# Patient Record
Sex: MALE | Race: BLACK OR AFRICAN AMERICAN | NOT HISPANIC OR LATINO | Employment: FULL TIME | URBAN - METROPOLITAN AREA
[De-identification: names, ages, dates, MRNs, and addresses within clinical notes are randomized per-mention and may not be internally consistent; named-entity substitution may affect disease eponyms.]

---

## 2022-08-27 ENCOUNTER — OFFICE VISIT (OUTPATIENT)
Dept: URGENT CARE | Facility: CLINIC | Age: 27
End: 2022-08-27
Payer: COMMERCIAL

## 2022-08-27 VITALS
WEIGHT: 175 LBS | OXYGEN SATURATION: 99 % | DIASTOLIC BLOOD PRESSURE: 84 MMHG | HEART RATE: 77 BPM | HEIGHT: 68 IN | SYSTOLIC BLOOD PRESSURE: 134 MMHG | BODY MASS INDEX: 26.52 KG/M2 | TEMPERATURE: 96 F | RESPIRATION RATE: 18 BRPM

## 2022-08-27 DIAGNOSIS — Z76.0 MEDICATION REFILL: ICD-10-CM

## 2022-08-27 DIAGNOSIS — Z11.3 SCREEN FOR STD (SEXUALLY TRANSMITTED DISEASE): Primary | ICD-10-CM

## 2022-08-27 LAB
C TRACH DNA SPEC QL NAA+PROBE: NOT DETECTED
N GONORRHOEA DNA SPEC QL NAA+PROBE: NOT DETECTED

## 2022-08-27 PROCEDURE — 86593 SYPHILIS TEST NON-TREP QUANT: CPT

## 2022-08-27 PROCEDURE — 87591 N.GONORRHOEAE DNA AMP PROB: CPT

## 2022-08-27 PROCEDURE — 87491 CHLMYD TRACH DNA AMP PROBE: CPT

## 2022-08-27 PROCEDURE — 80074 ACUTE HEPATITIS PANEL: CPT

## 2022-08-27 PROCEDURE — 86592 SYPHILIS TEST NON-TREP QUAL: CPT

## 2022-08-27 PROCEDURE — 99214 OFFICE O/P EST MOD 30 MIN: CPT | Mod: S$GLB,,,

## 2022-08-27 PROCEDURE — 87389 HIV-1 AG W/HIV-1&-2 AB AG IA: CPT

## 2022-08-27 PROCEDURE — 86780 TREPONEMA PALLIDUM: CPT

## 2022-08-27 PROCEDURE — 99214 PR OFFICE/OUTPT VISIT, EST, LEVL IV, 30-39 MIN: ICD-10-PCS | Mod: S$GLB,,,

## 2022-08-27 PROCEDURE — 87661 TRICHOMONAS VAGINALIS AMPLIF: CPT

## 2022-08-27 RX ORDER — EMTRICITABINE AND TENOFOVIR DISOPROXIL FUMARATE 200; 300 MG/1; MG/1
1 TABLET, FILM COATED ORAL DAILY
Qty: 30 TABLET | Refills: 0 | Status: SHIPPED | OUTPATIENT
Start: 2022-08-27 | End: 2022-09-26

## 2022-08-27 RX ORDER — EMTRICITABINE AND TENOFOVIR DISOPROXIL FUMARATE 200; 300 MG/1; MG/1
1 TABLET, FILM COATED ORAL DAILY
COMMUNITY
End: 2022-08-27 | Stop reason: SDUPTHER

## 2022-08-27 NOTE — PATIENT INSTRUCTIONS
STD Screening  You were tested for STDs on today  As far the STD testing, we may hold off on any medications until the labs result. We will phone in medication for you if needed.  If we have decided to treat you now be sure to take all the meds as directed.  If you need more meds when the labs result we will call you and phone them in for you.  If you do test positive for STDs you should be retested in about 3 months

## 2022-08-27 NOTE — PROGRESS NOTES
"Subjective:       Patient ID: Brandon Pal is a 27 y.o. male.    Vitals:  height is 5' 8" (1.727 m) and weight is 79.4 kg (175 lb). His tympanic temperature is 96.2 °F (35.7 °C). His blood pressure is 134/84 and his pulse is 77. His respiration is 18 and oxygen saturation is 99%.     Chief Complaint: Exposure to STD    Patient presents for STD check. No symptoms. Last not protected sexual intercourse, 7 days ago Sunday 08/20/2022. Not stable partner. Partner asymptomatic.      Exposure to STD  The patient's pertinent negatives include no genital injury, genital itching, genital lesions, pelvic pain, penile discharge, penile pain, priapism, scrotal swelling or testicular pain. This is a new problem. The current episode started in the past 7 days. The patient is experiencing no pain. Pertinent negatives include no abdominal pain, anorexia, chest pain, chills, constipation, coughing, diarrhea, discolored urine, dysuria, fever, flank pain, frequency, headaches, hematuria, hesitancy, joint pain, joint swelling, nausea, painful intercourse, rash, shortness of breath, sore throat, urgency, urinary retention or vomiting. Nothing aggravates the symptoms. He has tried nothing for the symptoms. He is sexually active. He inconsistently uses condoms. It is unknown whether or not his partner has an STD. There is no history of BPH, chlamydia, cryptorchidism, erectile aid use, erectile dysfunction, a femoral hernia, gonorrhea, herpes simplex, HIV, an inguinal hernia, kidney stones, prostatitis, sickle cell disease, syphilis or varicocele.     Constitution: Negative. Negative for chills and fever.   HENT: Negative.  Negative for sore throat.    Neck: neck negative. Negative for painful lymph nodes.   Cardiovascular: Negative.  Negative for chest pain, leg swelling and sob on exertion.   Respiratory:  Negative for cough and shortness of breath.    Gastrointestinal:  Negative for abdominal pain, nausea, vomiting, constipation and " diarrhea.   Endocrine: negative. hair loss and cold intolerance.   Genitourinary:  Negative for dysuria, frequency, urgency, urine decreased, flank pain, bladder incontinence, hematuria, genital trauma, painful ejaculation, genital sore, penile discharge, painful ejaculation, penile pain, penile swelling, scrotal swelling, testicular pain and pelvic pain.   Musculoskeletal: Negative.  Negative for pain, back pain and history of spine disorder.   Skin: Negative.  Negative for rash and hives.   Allergic/Immunologic: Negative for hives.   Neurological:  Negative for headaches, disorientation and altered mental status.   Hematologic/Lymphatic: Negative.  Negative for swollen lymph nodes and easy bruising/bleeding. Does not bruise/bleed easily.   Psychiatric/Behavioral: Negative.  Negative for altered mental status, disorientation and confusion.      Objective:      Physical Exam   Constitutional: He is oriented to person, place, and time. He appears well-developed.   HENT:   Head: Normocephalic and atraumatic.   Mouth/Throat: Mucous membranes are normal.   Eyes: Conjunctivae and lids are normal.   Neck: Trachea normal. Neck supple.   Pulmonary/Chest: Effort normal. No respiratory distress.   Abdominal: Normal appearance and bowel sounds are normal. He exhibits no distension, no abdominal bruit, no pulsatile midline mass and no mass. Soft. There is no abdominal tenderness. There is no rebound, no tenderness at McBurney's point, negative Hensley's sign, no left CVA tenderness, negative Rovsing's sign, negative psoas sign, no right CVA tenderness and negative obturator sign. A hernia is present. Hernia confirmed positive in the right inguinal area. Hernia confirmed negative in the umbilical area, confirmed negative in the ventral area, confirmed negative in the left inguinal area, confirmed negative in the right femoral and confirmed negative in the no left femoral area.   Musculoskeletal: Normal range of motion.          General: Normal range of motion.   Neurological: He is alert and oriented to person, place, and time. He has normal strength.   Skin: Skin is warm, dry, intact, not diaphoretic and not pale.   Psychiatric: His speech is normal and behavior is normal. Judgment and thought content normal.   Nursing note and vitals reviewed.      Assessment:       1. Screen for STD (sexually transmitted disease)    2. Medication refill          Plan:         Screen for STD (sexually transmitted disease)  -     C. trachomatis/N. gonorrhoeae by AMP DNA [facility]; Urine  -     Trichomonas vaginalis, RNA, Qual, Urine (Males Only)  -     HIV 1/2 Ag/Ab (4th Gen); Future; Expected date: 08/27/2022  -     HEPATITIS PANEL, ACUTE; Future; Expected date: 08/27/2022  -     RPR; Future; Expected date: 08/27/2022    Medication refill  -     emtricitabine-tenofovir 200-300 mg (TRUVADA) 200-300 mg Tab; Take 1 tablet by mouth once daily.  Dispense: 30 tablet; Refill: 0         Medical Decision Making:   Initial Assessment:   PT in room AAOX4, skin W/D, resp E/U, nonsurgical abdomen, non toxic in appearance, NAD.    Urgent Care Management:  Discussed with STD testing with PT. PT is nontoxic appearing and vitals are stable.  Patient is essentially neurovascularly intact on exam.   no concern for sepsis,  STD testing done with urine, trich, and GC. Discussed RPR, HIV, Hepatitis, Discussed in depth with patient, we will notify patient of the results in the next 3-7 days; can receive results on [email or web address]. Discussed  STD precautions given.  Reviewed PT must remain abstinent until complete all treatments for least 7-14 days.  If symptoms do not improve/worsens, patient was referred back to PCP for continued outpatient workup and management. Patient was instructed to return for re-evaluation for any worsening or change in current symptoms.  Patient requesting to have refill of PREP treatment, patient's prep will be refilled discussed with patient to please follow  up with primary care or the person who has filled this medication.  Patient reports last taking this medicine last night.  Patient verbalized understanding and agreed with the entirety of plan of care. PT ambulatory out of clinic NAD.

## 2022-08-29 LAB
HIV 1+2 AB+HIV1 P24 AG SERPL QL IA: NEGATIVE
RPR SER QL: REACTIVE
RPR SER-TITR: ABNORMAL {TITER}

## 2022-08-30 LAB
HAV IGM SERPL QL IA: NEGATIVE
HBV CORE IGM SERPL QL IA: NEGATIVE
HBV SURFACE AG SERPL QL IA: NEGATIVE
HCV AB SERPL QL IA: NEGATIVE

## 2022-08-31 LAB
T VAGINALIS RRNA SPEC QL NAA+PROBE: NOT DETECTED
TRICHOMONAS VAGINALIS RNA, QUAL, SOURCE: NORMAL

## 2022-09-01 LAB — T PALLIDUM AB SER QL IF: REACTIVE

## 2022-09-02 ENCOUNTER — TELEPHONE (OUTPATIENT)
Dept: URGENT CARE | Facility: CLINIC | Age: 27
End: 2022-09-02
Payer: COMMERCIAL

## 2022-09-02 DIAGNOSIS — A53.9 SYPHILIS (ACQUIRED): Primary | ICD-10-CM

## 2022-09-02 RX ORDER — DOXYCYCLINE HYCLATE 100 MG
100 TABLET ORAL 2 TIMES DAILY
Qty: 28 TABLET | Refills: 0 | Status: SHIPPED | OUTPATIENT
Start: 2022-09-02 | End: 2022-09-16

## 2022-09-02 NOTE — TELEPHONE ENCOUNTER
Take doxycycline twice a day for next 14 days.    Sexual partners from 90 days preceding diagnosis should receive empiric primary syphilis treatment         - Please use condoms for future sexual intercourse  - Please seek medical attention if you begin to experience any new symptoms.     Notify sexual partners of the need for testing + treatment.  Complete ALL medication prescribed!    NO sexual intercourse now and UNTIL 7 days after treatment.     Retest to ensure infection has cleared-there are infections that require more agressive treatment.  Retest for all STDS in 6 weeks and again in 6 months to ensure true negative results.            Results for orders placed or performed in visit on 08/27/22   C. trachomatis/N. gonorrhoeae by AMP DNA [facility]; Urine    Specimen: Genital   Result Value Ref Range    Chlamydia, Amplified DNA Not Detected Not Detected    N gonorrhoeae, amplified DNA Not Detected Not Detected   Trichomonas vaginalis, RNA, Qual, Urine (Males Only)    Specimen: Urine   Result Value Ref Range    Trichomonas vaginalis RNA, Qual, source See Below     Trichomonas vaginalis, QL, TMA Not detected    RPR   Result Value Ref Range    RPR Reactive (A) Non-reactive   HEPATITIS PANEL, ACUTE   Result Value Ref Range    Hepatitis B Surface Ag Negative Negative    Hep B C IgM Negative Negative    Hep A IgM Negative Negative    Hepatitis C Ab Negative Negative   HIV 1/2 Ag/Ab (4th Gen)   Result Value Ref Range    HIV 1/2 Ag/Ab Negative Negative   RPR, Quantitative   Result Value Ref Range    RPR Quantitative 1:2 (A)    FTA antibodies, IgG and IgM   Result Value Ref Range    FTA-ABS Reactive (A) Non-reactive   Please note: Today's testing will give no crediable information if you have unprotected sexual activities going forward. Syphillis cases are rising!  Gonorrhea has RESISTANT strains which is why repeat testing after treatment is important.  Gonorrhea may be present in multiple sites from just ONE area of  exposure.  For those who have high risk sexual behaviors and are on Truvada for PrEP- you have additional protection against HIV ONLY.      REMEMBER WEAR CONDOMS AND GET TESTED OFTEN!          Walk in [employer]

## 2022-10-27 ENCOUNTER — OFFICE VISIT (OUTPATIENT)
Dept: URGENT CARE | Facility: CLINIC | Age: 27
End: 2022-10-27
Payer: COMMERCIAL

## 2022-10-27 VITALS
HEIGHT: 68 IN | BODY MASS INDEX: 27.74 KG/M2 | TEMPERATURE: 101 F | SYSTOLIC BLOOD PRESSURE: 128 MMHG | RESPIRATION RATE: 18 BRPM | HEART RATE: 115 BPM | OXYGEN SATURATION: 97 % | WEIGHT: 183 LBS | DIASTOLIC BLOOD PRESSURE: 80 MMHG

## 2022-10-27 DIAGNOSIS — R05.9 COUGH, UNSPECIFIED TYPE: ICD-10-CM

## 2022-10-27 DIAGNOSIS — J10.1 INFLUENZA A: Primary | ICD-10-CM

## 2022-10-27 LAB
CTP QC/QA: YES
POC MOLECULAR INFLUENZA A AGN: POSITIVE
POC MOLECULAR INFLUENZA B AGN: NEGATIVE

## 2022-10-27 PROCEDURE — 99213 OFFICE O/P EST LOW 20 MIN: CPT | Mod: S$GLB,,, | Performed by: NURSE PRACTITIONER

## 2022-10-27 PROCEDURE — 87502 INFLUENZA DNA AMP PROBE: CPT | Mod: QW,S$GLB,, | Performed by: NURSE PRACTITIONER

## 2022-10-27 PROCEDURE — 3079F PR MOST RECENT DIASTOLIC BLOOD PRESSURE 80-89 MM HG: ICD-10-PCS | Mod: CPTII,S$GLB,, | Performed by: NURSE PRACTITIONER

## 2022-10-27 PROCEDURE — 87502 POCT INFLUENZA A/B MOLECULAR: ICD-10-PCS | Mod: QW,S$GLB,, | Performed by: NURSE PRACTITIONER

## 2022-10-27 PROCEDURE — 99213 PR OFFICE/OUTPT VISIT, EST, LEVL III, 20-29 MIN: ICD-10-PCS | Mod: S$GLB,,, | Performed by: NURSE PRACTITIONER

## 2022-10-27 PROCEDURE — 3074F SYST BP LT 130 MM HG: CPT | Mod: CPTII,S$GLB,, | Performed by: NURSE PRACTITIONER

## 2022-10-27 PROCEDURE — 1159F MED LIST DOCD IN RCRD: CPT | Mod: CPTII,S$GLB,, | Performed by: NURSE PRACTITIONER

## 2022-10-27 PROCEDURE — 1159F PR MEDICATION LIST DOCUMENTED IN MEDICAL RECORD: ICD-10-PCS | Mod: CPTII,S$GLB,, | Performed by: NURSE PRACTITIONER

## 2022-10-27 PROCEDURE — 3079F DIAST BP 80-89 MM HG: CPT | Mod: CPTII,S$GLB,, | Performed by: NURSE PRACTITIONER

## 2022-10-27 PROCEDURE — 3074F PR MOST RECENT SYSTOLIC BLOOD PRESSURE < 130 MM HG: ICD-10-PCS | Mod: CPTII,S$GLB,, | Performed by: NURSE PRACTITIONER

## 2022-10-27 RX ORDER — ALBUTEROL SULFATE 90 UG/1
2 AEROSOL, METERED RESPIRATORY (INHALATION) EVERY 6 HOURS PRN
Qty: 18 G | Refills: 0 | Status: SHIPPED | OUTPATIENT
Start: 2022-10-27 | End: 2022-12-26

## 2022-10-27 NOTE — PROGRESS NOTES
"Subjective:       Patient ID: Brandon Pal is a 27 y.o. male.    Vitals:  height is 5' 8" (1.727 m) and weight is 83 kg (183 lb). His tympanic temperature is 100.9 °F (38.3 °C) (abnormal). His blood pressure is 128/80 and his pulse is 115 (abnormal). His respiration is 18 and oxygen saturation is 97%.     Chief Complaint: Cough    27 yr old male presents to the Urgent Care with complaint of chills, nasal congestion, headache and wheezing x 5 days. Patient reports fever today. Patient denies any CP, SOB, and abdominal pain.     Cough  This is a new problem. The current episode started in the past 7 days. The problem has been gradually worsening. The problem occurs constantly. The cough is Non-productive. Associated symptoms include chills, a fever, headaches, nasal congestion, postnasal drip and wheezing. Pertinent negatives include no chest pain, ear congestion, ear pain, heartburn, hemoptysis, myalgias, rash, rhinorrhea, sore throat, shortness of breath, sweats or weight loss. Treatments tried: rx meds that did not work. The treatment provided no relief. There is no history of asthma, bronchiectasis, bronchitis, COPD, emphysema, environmental allergies or pneumonia.     Constitution: Positive for chills and fever. Negative for sweating and fatigue.   HENT:  Positive for postnasal drip. Negative for ear pain, congestion, sinus pain, sinus pressure, sore throat, trouble swallowing and voice change.    Cardiovascular:  Negative for chest pain and sob on exertion.   Respiratory:  Positive for cough and wheezing. Negative for sputum production, bloody sputum and shortness of breath.    Gastrointestinal:  Negative for heartburn.   Musculoskeletal:  Negative for muscle ache.   Skin:  Negative for rash.   Allergic/Immunologic: Negative for environmental allergies.   Neurological:  Positive for headaches. Negative for altered mental status.   Psychiatric/Behavioral:  Negative for altered mental status.      Objective:    "   Physical Exam   Constitutional: He is oriented to person, place, and time. He appears well-developed. He is cooperative.  Non-toxic appearance. He does not appear ill.   HENT:   Head: Atraumatic.   Ears:   Right Ear: Hearing, tympanic membrane, external ear and ear canal normal.   Left Ear: Hearing, tympanic membrane, external ear and ear canal normal.   Nose: Nose normal.   Mouth/Throat: Uvula is midline, oropharynx is clear and moist and mucous membranes are normal. Tonsils are 2+ on the right. Tonsils are 2+ on the left.   Cardiovascular: Normal rate, regular rhythm and normal heart sounds.   Pulmonary/Chest: Effort normal and breath sounds normal. No accessory muscle usage or stridor. No tachypnea. No respiratory distress. He has no decreased breath sounds. He has no wheezes. He has no rhonchi. He has no rales.   Abdominal: Normal appearance.   Neurological: He is alert and oriented to person, place, and time. Gait normal.   Skin: Skin is warm, dry, intact and not diaphoretic.   Psychiatric: He experiences Normal attention. His speech is normal and behavior is normal. Mood normal. Cognition normal  Nursing note and vitals reviewed.      Assessment:       1. Influenza A    2. Cough, unspecified type        Results for orders placed or performed in visit on 10/27/22   POCT Influenza A/B MOLECULAR   Result Value Ref Range    POC Molecular Influenza A Ag Positive (A) Negative, Not Reported    POC Molecular Influenza B Ag Negative Negative, Not Reported     Acceptable Yes        Plan:         Influenza A  -     albuterol (VENTOLIN HFA) 90 mcg/actuation inhaler; Inhale 2 puffs into the lungs every 6 (six) hours as needed for Wheezing or Shortness of Breath. Rescue  Dispense: 18 g; Refill: 0    Cough, unspecified type  -     POCT Influenza A/B MOLECULAR       Patient Instructions   You have been diagnosed with Influenza A.     Please drink plenty of fluids to stay hydrated.  Please get plenty of  rest.  Take tylenol (acetominophen) for fever, chills or body aches every 4 hours. do not exceed 4000 mg/ day.  Take Motrin (Ibuprofen) every 4 hours for fever, chills, pain or inflammation.  Use an antihistmine such as Claritin or Zyrtec (over the counter) to dry you out.   Use mucinex (guaifenisin) to break up mucous or Delsym to help with coughing at night  Flonase OTC or Nasacort OTC for nasal congestion.  Warm face compresses to help with facial sinus pain/pressure.  Simple foods like chicken noodle soup.    If you were prescribed a narcotic or controlled medication, do not drive or operate heavy equipment or machinery while taking these medications.    You must understand that you've received an Urgent Care treatment only and that you may be released before all your medical problems are known or treated. You, the patient, will arrange for follow up care as instructed.    Follow up with your PCP within the week if symptoms have not improved or as needed.  You can call [phone] to schedule an appointment with the appropriate provider.  If your condition worsens we recommend that you receive another evaluation at the emergency room immediately or contact your primary medical clinics after hours call service to discuss your concerns.  Please return here or go to the Emergency Department for any concerns or worsening of condition.

## 2022-10-27 NOTE — PATIENT INSTRUCTIONS
You have been diagnosed with Influenza A.     Please drink plenty of fluids to stay hydrated.  Please get plenty of rest.  Take tylenol (acetominophen) for fever, chills or body aches every 4 hours. do not exceed 4000 mg/ day.  Take Motrin (Ibuprofen) every 4 hours for fever, chills, pain or inflammation.  Use an antihistmine such as Claritin or Zyrtec (over the counter) to dry you out.   Use mucinex (guaifenisin) to break up mucous or Delsym to help with coughing at night  Flonase OTC or Nasacort OTC for nasal congestion.  Warm face compresses to help with facial sinus pain/pressure.  Simple foods like chicken noodle soup.    If you were prescribed a narcotic or controlled medication, do not drive or operate heavy equipment or machinery while taking these medications.    You must understand that you've received an Urgent Care treatment only and that you may be released before all your medical problems are known or treated. You, the patient, will arrange for follow up care as instructed.    Follow up with your PCP within the week if symptoms have not improved or as needed.  You can call [phone] to schedule an appointment with the appropriate provider.  If your condition worsens we recommend that you receive another evaluation at the emergency room immediately or contact your primary medical clinics after hours call service to discuss your concerns.  Please return here or go to the Emergency Department for any concerns or worsening of condition.

## 2022-11-06 ENCOUNTER — HOSPITAL ENCOUNTER (EMERGENCY)
Facility: HOSPITAL | Age: 27
Discharge: HOME | End: 2022-11-06
Attending: INTERNAL MEDICINE
Payer: COMMERCIAL

## 2022-11-06 VITALS
SYSTOLIC BLOOD PRESSURE: 133 MMHG | RESPIRATION RATE: 18 BRPM | WEIGHT: 181.19 LBS | DIASTOLIC BLOOD PRESSURE: 87 MMHG | OXYGEN SATURATION: 98 % | TEMPERATURE: 98 F | HEART RATE: 94 BPM | BODY MASS INDEX: 27.46 KG/M2 | HEIGHT: 68 IN

## 2022-11-06 DIAGNOSIS — J18.9 COMMUNITY ACQUIRED PNEUMONIA OF RIGHT LUNG, UNSPECIFIED PART OF LUNG: Primary | ICD-10-CM

## 2022-11-06 DIAGNOSIS — R07.9 CHEST PAIN: ICD-10-CM

## 2022-11-06 DIAGNOSIS — R07.81 PLEURITIC CHEST PAIN: ICD-10-CM

## 2022-11-06 LAB
BASOPHILS # BLD AUTO: 0.06 X10(3)/MCL (ref 0–0.2)
BASOPHILS NFR BLD AUTO: 0.5 %
EOSINOPHIL # BLD AUTO: 0.19 X10(3)/MCL (ref 0–0.9)
EOSINOPHIL NFR BLD AUTO: 1.7 %
ERYTHROCYTE [DISTWIDTH] IN BLOOD BY AUTOMATED COUNT: 12.9 % (ref 11.5–17)
HCT VFR BLD AUTO: 45.6 % (ref 42–52)
HGB BLD-MCNC: 14.9 GM/DL (ref 14–18)
HIV 1+2 AB+HIV1 P24 AG SERPL QL IA: NONREACTIVE
IMM GRANULOCYTES # BLD AUTO: 0.08 X10(3)/MCL (ref 0–0.04)
IMM GRANULOCYTES NFR BLD AUTO: 0.7 %
LYMPHOCYTES # BLD AUTO: 2.28 X10(3)/MCL (ref 0.6–4.6)
LYMPHOCYTES NFR BLD AUTO: 19.9 %
MCH RBC QN AUTO: 26.7 PG (ref 27–31)
MCHC RBC AUTO-ENTMCNC: 32.7 MG/DL (ref 33–36)
MCV RBC AUTO: 81.6 FL (ref 80–94)
MONOCYTES # BLD AUTO: 1.54 X10(3)/MCL (ref 0.1–1.3)
MONOCYTES NFR BLD AUTO: 13.5 %
NEUTROPHILS # BLD AUTO: 7.3 X10(3)/MCL (ref 2.1–9.2)
NEUTROPHILS NFR BLD AUTO: 63.7 %
NRBC BLD AUTO-RTO: 0 %
PLATELET # BLD AUTO: 397 X10(3)/MCL (ref 130–400)
PMV BLD AUTO: 9.7 FL (ref 7.4–10.4)
RBC # BLD AUTO: 5.59 X10(6)/MCL (ref 4.7–6.1)
WBC # SPEC AUTO: 11.4 X10(3)/MCL (ref 4.5–11.5)

## 2022-11-06 PROCEDURE — 87389 HIV-1 AG W/HIV-1&-2 AB AG IA: CPT | Performed by: INTERNAL MEDICINE

## 2022-11-06 PROCEDURE — 93005 ELECTROCARDIOGRAM TRACING: CPT

## 2022-11-06 PROCEDURE — 99285 EMERGENCY DEPT VISIT HI MDM: CPT | Mod: 25

## 2022-11-06 PROCEDURE — 85025 COMPLETE CBC W/AUTO DIFF WBC: CPT | Performed by: INTERNAL MEDICINE

## 2022-11-06 RX ORDER — CODEINE PHOSPHATE AND GUAIFENESIN 10; 100 MG/5ML; MG/5ML
5 SOLUTION ORAL 3 TIMES DAILY PRN
Qty: 118 ML | Refills: 0 | Status: SHIPPED | OUTPATIENT
Start: 2022-11-06 | End: 2022-11-16

## 2022-11-06 RX ORDER — DOXYCYCLINE 100 MG/1
100 CAPSULE ORAL 2 TIMES DAILY
Qty: 20 CAPSULE | Refills: 0 | Status: SHIPPED | OUTPATIENT
Start: 2022-11-06 | End: 2022-11-16

## 2022-11-06 NOTE — ED PROVIDER NOTES
Encounter Date: 11/6/2022       History     Chief Complaint   Patient presents with    Chest Pain     Pt c/o left sided chest pain started today.      Recently Dx with Influenza and sinusitis. Completed antibiotic therapy and promethazine DM for cough. Yesterday started with chest pain. Pain is achy, anterior chest, worse with cough or deep breathing. No fever, shortness of breath, hemoptysis, swelling or rash    The history is provided by the patient.   Review of patient's allergies indicates:  No Known Allergies  History reviewed. No pertinent past medical history.  History reviewed. No pertinent surgical history.  History reviewed. No pertinent family history.  Social History     Tobacco Use    Smoking status: Never     Passive exposure: Past    Smokeless tobacco: Never   Substance Use Topics    Alcohol use: Yes    Drug use: Never     Review of Systems   Constitutional:  Negative for fever.   HENT:  Negative for sore throat.    Respiratory:  Positive for cough. Negative for shortness of breath.    Cardiovascular:  Positive for chest pain.   Gastrointestinal:  Negative for nausea.   Genitourinary:  Negative for dysuria.   Musculoskeletal:  Negative for back pain.   Skin:  Negative for rash.   Neurological:  Negative for weakness.   Hematological:  Does not bruise/bleed easily.   All other systems reviewed and are negative.    Physical Exam     Initial Vitals [11/06/22 0247]   BP Pulse Resp Temp SpO2   (!) 140/90 107 18 97.9 °F (36.6 °C) 99 %      MAP       --         Physical Exam    Nursing note and vitals reviewed.  Constitutional: He appears well-developed and well-nourished. No distress.   HENT:   Head: Normocephalic and atraumatic.   Nose: Nose normal.   Mouth/Throat: Oropharynx is clear and moist. No oropharyngeal exudate.   Eyes: Conjunctivae are normal. Pupils are equal, round, and reactive to light.   Neck: Neck supple.   Normal range of motion.  Cardiovascular:  Regular rhythm, normal heart sounds and  intact distal pulses.           Pulmonary/Chest: Breath sounds normal. No respiratory distress.   Abdominal: Abdomen is soft. Bowel sounds are normal. He exhibits no distension. There is no abdominal tenderness. There is no rebound and no guarding.   Musculoskeletal:         General: No edema. Normal range of motion.      Cervical back: Normal range of motion and neck supple.     Neurological: He is alert and oriented to person, place, and time. He has normal strength. GCS score is 15. GCS eye subscore is 4. GCS verbal subscore is 5. GCS motor subscore is 6.   Skin: Skin is warm and dry. No rash noted.   Psychiatric: His behavior is normal.       ED Course   Procedures  Labs Reviewed   CBC WITH DIFFERENTIAL - Abnormal; Notable for the following components:       Result Value    MCH 26.7 (*)     MCHC 32.7 (*)     Mono # 1.54 (*)     IG# 0.08 (*)     All other components within normal limits   HIV 1 / 2 ANTIBODY - Normal   CBC W/ AUTO DIFFERENTIAL    Narrative:     The following orders were created for panel order CBC auto differential.  Procedure                               Abnormality         Status                     ---------                               -----------         ------                     CBC with Differential[[id number]]        Abnormal            Final result                 Please view results for these tests on the individual orders.     EKG Readings: (Independently Interpreted)   Initial Reading: No STEMI. Rhythm: Normal Sinus Rhythm. Heart Rate: 100. Ectopy: No Ectopy. Conduction: Normal. ST Segments: Normal ST Segments. T Waves: Normal. Axis: Right Axis Deviation. Clinical Impression: Normal Sinus Rhythm     Imaging Results              X-Ray Chest PA And Lateral (In process)                   X-Rays:   Independently Interpreted Readings:   Chest X-Ray: Normal heart size. There is an infiltrate in the Right hilum. Scoliosis   Medications - No data to display                    3:49 AM    On  chart review pt taking HIV prophylaxis (Prep) due vital illness and possible pneumonia after finishing Zithromax will test. Pt states last test was in July.       Clinical Impression:   Final diagnoses:  [R07.9] Chest pain  [J18.9] Community acquired pneumonia of right lung, unspecified part of lung (Primary)  [R07.81] Pleuritic chest pain                   Tremayne Mcmillan MD  11/06/22 0341       Tremayne Mcmillan MD  11/06/22 0448

## 2022-12-26 ENCOUNTER — OFFICE VISIT (OUTPATIENT)
Dept: URGENT CARE | Facility: CLINIC | Age: 27
End: 2022-12-26
Payer: COMMERCIAL

## 2022-12-26 VITALS
RESPIRATION RATE: 18 BRPM | HEIGHT: 68 IN | SYSTOLIC BLOOD PRESSURE: 139 MMHG | WEIGHT: 175 LBS | OXYGEN SATURATION: 98 % | BODY MASS INDEX: 26.52 KG/M2 | DIASTOLIC BLOOD PRESSURE: 83 MMHG | HEART RATE: 96 BPM | TEMPERATURE: 98 F

## 2022-12-26 DIAGNOSIS — Z76.0 MEDICATION REFILL: Primary | ICD-10-CM

## 2022-12-26 DIAGNOSIS — R37 SEXUAL DYSFUNCTION: ICD-10-CM

## 2022-12-26 PROCEDURE — 1159F PR MEDICATION LIST DOCUMENTED IN MEDICAL RECORD: ICD-10-PCS | Mod: CPTII,S$GLB,, | Performed by: PHYSICIAN ASSISTANT

## 2022-12-26 PROCEDURE — 1160F RVW MEDS BY RX/DR IN RCRD: CPT | Mod: CPTII,S$GLB,, | Performed by: PHYSICIAN ASSISTANT

## 2022-12-26 PROCEDURE — 3075F PR MOST RECENT SYSTOLIC BLOOD PRESS GE 130-139MM HG: ICD-10-PCS | Mod: CPTII,S$GLB,, | Performed by: PHYSICIAN ASSISTANT

## 2022-12-26 PROCEDURE — 3075F SYST BP GE 130 - 139MM HG: CPT | Mod: CPTII,S$GLB,, | Performed by: PHYSICIAN ASSISTANT

## 2022-12-26 PROCEDURE — 3008F BODY MASS INDEX DOCD: CPT | Mod: CPTII,S$GLB,, | Performed by: PHYSICIAN ASSISTANT

## 2022-12-26 PROCEDURE — 3079F DIAST BP 80-89 MM HG: CPT | Mod: CPTII,S$GLB,, | Performed by: PHYSICIAN ASSISTANT

## 2022-12-26 PROCEDURE — 3079F PR MOST RECENT DIASTOLIC BLOOD PRESSURE 80-89 MM HG: ICD-10-PCS | Mod: CPTII,S$GLB,, | Performed by: PHYSICIAN ASSISTANT

## 2022-12-26 PROCEDURE — 1159F MED LIST DOCD IN RCRD: CPT | Mod: CPTII,S$GLB,, | Performed by: PHYSICIAN ASSISTANT

## 2022-12-26 PROCEDURE — 3008F PR BODY MASS INDEX (BMI) DOCUMENTED: ICD-10-PCS | Mod: CPTII,S$GLB,, | Performed by: PHYSICIAN ASSISTANT

## 2022-12-26 PROCEDURE — 99213 OFFICE O/P EST LOW 20 MIN: CPT | Mod: S$GLB,,, | Performed by: PHYSICIAN ASSISTANT

## 2022-12-26 PROCEDURE — 99213 PR OFFICE/OUTPT VISIT, EST, LEVL III, 20-29 MIN: ICD-10-PCS | Mod: S$GLB,,, | Performed by: PHYSICIAN ASSISTANT

## 2022-12-26 PROCEDURE — 1160F PR REVIEW ALL MEDS BY PRESCRIBER/CLIN PHARMACIST DOCUMENTED: ICD-10-PCS | Mod: CPTII,S$GLB,, | Performed by: PHYSICIAN ASSISTANT

## 2022-12-26 RX ORDER — ALBUTEROL SULFATE 90 UG/1
2 AEROSOL, METERED RESPIRATORY (INHALATION) EVERY 6 HOURS PRN
Qty: 18 G | Refills: 0 | Status: SHIPPED | OUTPATIENT
Start: 2022-12-26 | End: 2022-12-26

## 2022-12-26 RX ORDER — EMTRICITABINE AND TENOFOVIR ALAFENAMIDE 200; 25 MG/1; MG/1
1 TABLET ORAL
COMMUNITY
Start: 2022-10-21

## 2022-12-26 RX ORDER — ALBUTEROL SULFATE 90 UG/1
2 AEROSOL, METERED RESPIRATORY (INHALATION) EVERY 6 HOURS PRN
Qty: 18 G | Refills: 0 | Status: SHIPPED | OUTPATIENT
Start: 2022-12-26 | End: 2023-03-02 | Stop reason: SDUPTHER

## 2022-12-26 NOTE — PATIENT INSTRUCTIONS

## 2022-12-26 NOTE — PROGRESS NOTES
"Subjective:       Patient ID: Brandon Pal is a 27 y.o. male.    Vitals:  height is 5' 8" (1.727 m) and weight is 79.4 kg (175 lb). His tympanic temperature is 98.1 °F (36.7 °C). His blood pressure is 139/83 and his pulse is 96. His respiration is 18 and oxygen saturation is 98%.     Chief Complaint: Medication Reaction    Patient is a 27 year old male who presents for medication refill. He states influenza and pneumonia in November. Pt needs refill prescription on albuterol inhaler as he had relief with this. He denies shortness of breath or chest pain at this time. He states when the weather changes he has minimal chest tightness that is always releived with albuterol. He denies night time cough or persistent wheezing. He has not yet follow up with his PCP.     He does note some recent sexual dysfunction over the last year after experiencing some traumas. Patient is concerned that his anxiety could be contributing to this. Patient has no SI/HI. He is wanting medication to help with performance.     Shortness of Breath  This is a new problem. Pertinent negatives include no abdominal pain, chest pain, claudication, coryza, ear pain, fever, headaches, hemoptysis, leg pain, leg swelling, neck pain, orthopnea, PND, rash, rhinorrhea, sore throat, sputum production, swollen glands, syncope, vomiting or wheezing. Nothing aggravates the symptoms. He has tried nothing for the symptoms. His past medical history is significant for pneumonia. There is no history of allergies, aspirin allergies, asthma, bronchiolitis, CAD, chronic lung disease, COPD, DVT, a heart failure, PE or a recent surgery.     Constitution: Negative for chills and fever.   HENT:  Negative for ear pain, congestion, postnasal drip, sinus pain, sinus pressure, sore throat and trouble swallowing.    Neck: Negative for neck pain and painful lymph nodes.   Cardiovascular:  Negative for chest pain, leg swelling and passing out.   Respiratory:  Positive for " shortness of breath. Negative for cough, sputum production, bloody sputum and wheezing.    Gastrointestinal:  Negative for abdominal pain, nausea, vomiting and diarrhea.   Musculoskeletal:  Negative for muscle ache.   Skin:  Negative for rash.   Neurological:  Negative for headaches.   Hematologic/Lymphatic: Negative for swollen lymph nodes.     Objective:      Physical Exam   Constitutional: He is oriented to person, place, and time. He appears well-developed. He is cooperative.  Non-toxic appearance. He does not appear ill. No distress.   HENT:   Head: Normocephalic and atraumatic.   Ears:   Right Ear: Hearing, tympanic membrane, external ear and ear canal normal.   Left Ear: Hearing, tympanic membrane, external ear and ear canal normal.   Nose: Nose normal. No mucosal edema, rhinorrhea or nasal deformity. No epistaxis. Right sinus exhibits no maxillary sinus tenderness and no frontal sinus tenderness. Left sinus exhibits no maxillary sinus tenderness and no frontal sinus tenderness.   Mouth/Throat: Uvula is midline, oropharynx is clear and moist and mucous membranes are normal. No trismus in the jaw. Normal dentition. No uvula swelling. No oropharyngeal exudate, posterior oropharyngeal edema or posterior oropharyngeal erythema.   Eyes: Conjunctivae and lids are normal. No scleral icterus.   Neck: Trachea normal and phonation normal. Neck supple. No edema present. No erythema present. No neck rigidity present.   Cardiovascular: Normal rate, regular rhythm, normal heart sounds and normal pulses.   Pulmonary/Chest: Effort normal and breath sounds normal. No respiratory distress. He has no decreased breath sounds. He has no rhonchi.   Abdominal: Normal appearance.   Musculoskeletal: Normal range of motion.         General: No deformity. Normal range of motion.   Neurological: He is alert and oriented to person, place, and time. He exhibits normal muscle tone. Coordination normal.   Skin: Skin is warm, dry, intact,  not diaphoretic and not pale.   Psychiatric: His speech is normal and behavior is normal. Judgment and thought content normal.   Nursing note and vitals reviewed.      Assessment:       1. Medication refill    2. Sexual dysfunction          Plan:         Medication refill  -     Ambulatory referral/consult to Family Practice  -     albuterol (PROVENTIL HFA) 90 mcg/actuation inhaler; Inhale 2 puffs into the lungs every 6 (six) hours as needed for Wheezing. Rescue  Dispense: 18 g; Refill: 0    Sexual dysfunction  -     Ambulatory referral/consult to Family Practice    Discussed normal exam today and will refill medication at this time. Discussed need for follow up with PCP for continued care and further refills. Discussed sexual dysfunction due to anxiety is common but is best treated with better follow up and more consistent care than is available in the  setting. Discussed follow up with PCP. Patient would like a referral for a different PCP at this time. Referral placed today. Patient verbalized understanding and agrees with plan .    [facility]    Patient Instructions   General Discharge Instructions   If you were prescribed a narcotic or controlled medication, do not drive or operate heavy equipment or machinery while taking these medications.  If you were prescribed antibiotics, please take them to completion.  You must understand that you've received an Urgent Care treatment only and that you may be released before all your medical problems are known or treated. You, the patient, will arrange for follow up care as instructed.  Follow up with your PCP or specialty clinic as directed in the next 1-2 weeks if not improved or as needed.  You can call [phone] to schedule an appointment with the appropriate provider.  If your condition worsens we recommend that you receive another evaluation at the emergency room immediately or contact your primary medical clinics after hours call service to discuss  your concerns.  Please return here or go to the Emergency Department for any concerns or worsening of condition.

## 2022-12-30 ENCOUNTER — TELEPHONE (OUTPATIENT)
Dept: URGENT CARE | Facility: CLINIC | Age: 27
End: 2022-12-30
Payer: COMMERCIAL

## 2023-02-28 ENCOUNTER — PATIENT MESSAGE (OUTPATIENT)
Dept: ADMINISTRATIVE | Facility: OTHER | Age: 28
End: 2023-02-28
Payer: COMMERCIAL

## 2023-03-02 ENCOUNTER — OFFICE VISIT (OUTPATIENT)
Dept: URGENT CARE | Facility: CLINIC | Age: 28
End: 2023-03-02
Payer: COMMERCIAL

## 2023-03-02 VITALS
OXYGEN SATURATION: 97 % | RESPIRATION RATE: 18 BRPM | BODY MASS INDEX: 26.52 KG/M2 | HEIGHT: 68 IN | DIASTOLIC BLOOD PRESSURE: 81 MMHG | TEMPERATURE: 98 F | WEIGHT: 175 LBS | SYSTOLIC BLOOD PRESSURE: 131 MMHG | HEART RATE: 99 BPM

## 2023-03-02 DIAGNOSIS — Z76.0 MEDICATION REFILL: ICD-10-CM

## 2023-03-02 DIAGNOSIS — R06.02 SOB (SHORTNESS OF BREATH): Primary | ICD-10-CM

## 2023-03-02 LAB
CTP QC/QA: YES
SARS-COV-2 AG RESP QL IA.RAPID: NEGATIVE

## 2023-03-02 PROCEDURE — 99214 OFFICE O/P EST MOD 30 MIN: CPT | Mod: S$GLB,,, | Performed by: PHYSICIAN ASSISTANT

## 2023-03-02 PROCEDURE — 3008F BODY MASS INDEX DOCD: CPT | Mod: CPTII,S$GLB,, | Performed by: PHYSICIAN ASSISTANT

## 2023-03-02 PROCEDURE — 3079F PR MOST RECENT DIASTOLIC BLOOD PRESSURE 80-89 MM HG: ICD-10-PCS | Mod: CPTII,S$GLB,, | Performed by: PHYSICIAN ASSISTANT

## 2023-03-02 PROCEDURE — 3075F PR MOST RECENT SYSTOLIC BLOOD PRESS GE 130-139MM HG: ICD-10-PCS | Mod: CPTII,S$GLB,, | Performed by: PHYSICIAN ASSISTANT

## 2023-03-02 PROCEDURE — 3008F PR BODY MASS INDEX (BMI) DOCUMENTED: ICD-10-PCS | Mod: CPTII,S$GLB,, | Performed by: PHYSICIAN ASSISTANT

## 2023-03-02 PROCEDURE — 3079F DIAST BP 80-89 MM HG: CPT | Mod: CPTII,S$GLB,, | Performed by: PHYSICIAN ASSISTANT

## 2023-03-02 PROCEDURE — 1160F RVW MEDS BY RX/DR IN RCRD: CPT | Mod: CPTII,S$GLB,, | Performed by: PHYSICIAN ASSISTANT

## 2023-03-02 PROCEDURE — 87811 SARS CORONAVIRUS 2 ANTIGEN POCT, MANUAL READ: ICD-10-PCS | Mod: QW,S$GLB,, | Performed by: PHYSICIAN ASSISTANT

## 2023-03-02 PROCEDURE — 87811 SARS-COV-2 COVID19 W/OPTIC: CPT | Mod: QW,S$GLB,, | Performed by: PHYSICIAN ASSISTANT

## 2023-03-02 PROCEDURE — 1160F PR REVIEW ALL MEDS BY PRESCRIBER/CLIN PHARMACIST DOCUMENTED: ICD-10-PCS | Mod: CPTII,S$GLB,, | Performed by: PHYSICIAN ASSISTANT

## 2023-03-02 PROCEDURE — 1159F MED LIST DOCD IN RCRD: CPT | Mod: CPTII,S$GLB,, | Performed by: PHYSICIAN ASSISTANT

## 2023-03-02 PROCEDURE — 1159F PR MEDICATION LIST DOCUMENTED IN MEDICAL RECORD: ICD-10-PCS | Mod: CPTII,S$GLB,, | Performed by: PHYSICIAN ASSISTANT

## 2023-03-02 PROCEDURE — 3075F SYST BP GE 130 - 139MM HG: CPT | Mod: CPTII,S$GLB,, | Performed by: PHYSICIAN ASSISTANT

## 2023-03-02 PROCEDURE — 99214 PR OFFICE/OUTPT VISIT, EST, LEVL IV, 30-39 MIN: ICD-10-PCS | Mod: S$GLB,,, | Performed by: PHYSICIAN ASSISTANT

## 2023-03-02 RX ORDER — ALBUTEROL SULFATE 90 UG/1
2 AEROSOL, METERED RESPIRATORY (INHALATION) EVERY 6 HOURS PRN
Qty: 18 G | Refills: 0 | Status: SHIPPED | OUTPATIENT
Start: 2023-03-02 | End: 2023-10-23 | Stop reason: SDUPTHER

## 2023-03-02 NOTE — PATIENT INSTRUCTIONS
You will need to use your RESCUE inhaler every 4 hours if your symptoms persist, and then use as directed every 6 hours as needed.    You should schedule a follow-up appointment with primary care as we discussed.   Go to the ER if your symptoms worsen.         - You must understand that you have received an Urgent Care treatment only and that you may be released before all of your medical problems are known or treated.   - You, the patient, will arrange for follow up care as instructed with your primary care provider or recommended specialist.   - If your condition worsens or fails to improve we recommend that you receive another evaluation at the ER immediately or contact your PCP to discuss your concerns, or return here.   - Please do not drive or make any important decisions for 24 hours if you have received any pain medications, sedatives or mood altering drugs during your visit.    Disclaimer: This document was drafted with the use of a voice recognition device and is likely to have sound alike errors.

## 2023-03-02 NOTE — PROGRESS NOTES
"Subjective:       Patient ID: Brandon Pal is a 27 y.o. male.    Vitals:  height is 5' 8" (1.727 m) and weight is 79.4 kg (175 lb). His oral temperature is 98.1 °F (36.7 °C). His blood pressure is 131/81 and his pulse is 99. His respiration is 18 and oxygen saturation is 97%.     Chief Complaint: Shortness of Breath    Pt c/o shortness of breath on and off for some months. Pt states worse last night. Pt also c/o mid line chest pressure.     Shortness of Breath  This is a new problem. The current episode started yesterday. The problem has been waxing and waning. Associated symptoms include orthopnea. Pertinent negatives include no abdominal pain, chest pain, claudication, coryza, ear pain, fever, headaches, hemoptysis, leg pain, leg swelling, neck pain, PND, rash, rhinorrhea, sore throat, sputum production, swollen glands, syncope, vomiting or wheezing. Risk factors include smoking. Treatments tried: Albuterol inhaler. The treatment provided moderate relief. His past medical history is significant for pneumonia. There is no history of allergies, aspirin allergies, asthma, bronchiolitis, CAD, chronic lung disease, COPD, DVT, a heart failure, PE or a recent surgery.     Constitution: Negative for fever.   HENT:  Negative for ear pain and sore throat.    Neck: Negative for neck pain.   Cardiovascular:  Negative for chest pain, leg swelling and passing out.   Respiratory:  Positive for shortness of breath. Negative for sputum production, bloody sputum and wheezing.    Gastrointestinal:  Negative for abdominal pain and vomiting.   Skin:  Negative for rash.   Neurological:  Negative for headaches.     Objective:      Vitals:    03/02/23 1353   BP: 131/81   Pulse: 99   Resp: 18   Temp: 98.1 °F (36.7 °C)   TempSrc: Oral   SpO2: 97%   Weight: 79.4 kg (175 lb)   Height: 5' 8" (1.727 m)       Physical Exam   Constitutional: He is oriented to person, place, and time. He appears well-developed. He is cooperative.  Non-toxic " appearance. He does not appear ill. No distress.   HENT:   Head: Normocephalic and atraumatic.   Ears:   Right Ear: Hearing, tympanic membrane, external ear and ear canal normal.   Left Ear: Hearing, tympanic membrane, external ear and ear canal normal.   Nose: Nose normal. No mucosal edema, rhinorrhea or nasal deformity. No epistaxis. Right sinus exhibits no maxillary sinus tenderness and no frontal sinus tenderness. Left sinus exhibits no maxillary sinus tenderness and no frontal sinus tenderness.   Mouth/Throat: Uvula is midline, oropharynx is clear and moist and mucous membranes are normal. No trismus in the jaw. Normal dentition. No uvula swelling. No oropharyngeal exudate, posterior oropharyngeal edema or posterior oropharyngeal erythema.   Eyes: Conjunctivae and lids are normal. No scleral icterus.   Neck: Trachea normal and phonation normal. Neck supple. No edema present. No erythema present. No neck rigidity present.   Cardiovascular: Normal rate, regular rhythm, normal heart sounds and normal pulses.   Pulmonary/Chest: Effort normal and breath sounds normal. No stridor. No respiratory distress. He has no decreased breath sounds. He has no wheezes. He has no rhonchi. He has no rales. He exhibits no tenderness.   Abdominal: Normal appearance.   Musculoskeletal: Normal range of motion.         General: No deformity. Normal range of motion.   Neurological: He is alert and oriented to person, place, and time. He exhibits normal muscle tone. Coordination normal.   Skin: Skin is warm, dry, intact, not diaphoretic and not pale.   Psychiatric: His speech is normal and behavior is normal. Judgment and thought content normal.   Nursing note and vitals reviewed.      Assessment:       1. SOB (shortness of breath)    2. Medication refill        Results for orders placed or performed in visit on 03/02/23   SARS Coronavirus 2 Antigen, POCT Manual Read   Result Value Ref Range    SARS Coronavirus 2 Antigen Negative  Negative     Acceptable Yes        Plan:         SOB (shortness of breath)  -     SARS Coronavirus 2 Antigen, POCT Manual Read    Medication refill  -     albuterol (PROVENTIL HFA) 90 mcg/actuation inhaler; Inhale 2 puffs into the lungs every 6 (six) hours as needed for Wheezing. Rescue  Dispense: 18 g; Refill: 0           Medical Decision Making:   Initial Assessment:   Patient reports that he had pneumonia back in November and recovered with antibiotics.  Reports that shortness of breath has been intermittent since then.  Reports that it usually resolves with his inhaler.    Patient reports that shortness of breath occurred yesterday but is not active at this time today since using inhaler.  Requesting inhaler refill.  Clinical Tests:   Lab Tests: Ordered and Reviewed  Urgent Care Management:  Considered chest x-ray and nebulized treatment but since patient lungs are clear to auscultation bilaterally and he does not feel short of breath with normal vital signs we did not do a nebulized treatment or cxr.  We will refill inhaler.         Patient Instructions     You will need to use your RESCUE inhaler every 4 hours if your symptoms persist, and then use as directed every 6 hours as needed.    You should schedule a follow-up appointment with primary care as we discussed.   Go to the ER if your symptoms worsen.         - You must understand that you have received an Urgent Care treatment only and that you may be released before all of your medical problems are known or treated.   - You, the patient, will arrange for follow up care as instructed with your primary care provider or recommended specialist.   - If your condition worsens or fails to improve we recommend that you receive another evaluation at the ER immediately or contact your PCP to discuss your concerns, or return here.   - Please do not drive or make any important decisions for 24 hours if you have received any pain medications, sedatives  or mood altering drugs during your visit.    Disclaimer: This document was drafted with the use of a voice recognition device and is likely to have sound alike errors.

## 2023-03-05 ENCOUNTER — TELEPHONE (OUTPATIENT)
Dept: URGENT CARE | Facility: CLINIC | Age: 28
End: 2023-03-05
Payer: COMMERCIAL

## 2023-03-05 NOTE — TELEPHONE ENCOUNTER
Re: Courtesy Call    Spoke with the patient. He stated he is feeling better after his recent visit, however he still has some lingering symptoms. He has made an appointment with his PCP to follow up next week.

## 2023-03-28 ENCOUNTER — NURSE TRIAGE (OUTPATIENT)
Dept: ADMINISTRATIVE | Facility: CLINIC | Age: 28
End: 2023-03-28
Payer: COMMERCIAL

## 2023-03-28 NOTE — TELEPHONE ENCOUNTER
Patient states he his recent labs for testosterone were low and he is receiving testosterone treatment. Patient called to discuss his testosterone therapy regimen for possible change in the dosage. Patient declined triage at this time and states he is asymptomatic and only calling for advice regarding his medication regimen.    Patient advised to discuss his testosterone regimen with his Provider. Patient states understanding of care advice.   Reason for Disposition   [1] Caller requesting NON-URGENT health information AND [2] PCP's office is the best resource    Additional Information   Negative: [1] Caller is not with the adult (patient) AND [2] reporting urgent symptoms   Negative: Lab result questions   Negative: Medication questions   Negative: Caller can't be reached by phone   Negative: Caller has already spoken to PCP or another triager   Negative: RN needs further essential information from caller in order to complete triage   Negative: Requesting regular office appointment    Protocols used: Information Only Call - No Triage-A-

## 2023-04-20 ENCOUNTER — OFFICE VISIT (OUTPATIENT)
Dept: URGENT CARE | Facility: CLINIC | Age: 28
End: 2023-04-20
Payer: COMMERCIAL

## 2023-04-20 VITALS
DIASTOLIC BLOOD PRESSURE: 80 MMHG | WEIGHT: 187 LBS | BODY MASS INDEX: 28.34 KG/M2 | TEMPERATURE: 98 F | OXYGEN SATURATION: 98 % | RESPIRATION RATE: 20 BRPM | HEART RATE: 80 BPM | HEIGHT: 68 IN | SYSTOLIC BLOOD PRESSURE: 129 MMHG

## 2023-04-20 DIAGNOSIS — Z11.3 SCREEN FOR STD (SEXUALLY TRANSMITTED DISEASE): Primary | ICD-10-CM

## 2023-04-20 PROCEDURE — 87529 HSV DNA AMP PROBE: CPT | Performed by: PHYSICIAN ASSISTANT

## 2023-04-20 PROCEDURE — 99213 PR OFFICE/OUTPT VISIT, EST, LEVL III, 20-29 MIN: ICD-10-PCS | Mod: S$GLB,,, | Performed by: PHYSICIAN ASSISTANT

## 2023-04-20 PROCEDURE — 86593 SYPHILIS TEST NON-TREP QUANT: CPT | Performed by: PHYSICIAN ASSISTANT

## 2023-04-20 PROCEDURE — 87661 TRICHOMONAS VAGINALIS AMPLIF: CPT | Performed by: PHYSICIAN ASSISTANT

## 2023-04-20 PROCEDURE — 99213 OFFICE O/P EST LOW 20 MIN: CPT | Mod: S$GLB,,, | Performed by: PHYSICIAN ASSISTANT

## 2023-04-20 PROCEDURE — 86780 TREPONEMA PALLIDUM: CPT | Performed by: PHYSICIAN ASSISTANT

## 2023-04-20 PROCEDURE — 87591 N.GONORRHOEAE DNA AMP PROB: CPT | Performed by: PHYSICIAN ASSISTANT

## 2023-04-20 PROCEDURE — 87389 HIV-1 AG W/HIV-1&-2 AB AG IA: CPT | Performed by: PHYSICIAN ASSISTANT

## 2023-04-20 PROCEDURE — 86592 SYPHILIS TEST NON-TREP QUAL: CPT | Performed by: PHYSICIAN ASSISTANT

## 2023-04-20 NOTE — PROGRESS NOTES
"Subjective:      Patient ID: Brandon Pal is a 27 y.o. male.    Vitals:  height is 5' 8" (1.727 m) and weight is 84.8 kg (187 lb). His tympanic temperature is 98.1 °F (36.7 °C). His blood pressure is 129/80 and his pulse is 80. His respiration is 20 and oxygen saturation is 98%.     Chief Complaint: Exposure to STD    28 y/o male came into the clinic today for a STD screening. Possible exposure to Chlamydia and Gonorrhea by male partner. Pt have not symptoms at the moment. Also requesting full STD panel to know status.     Exposure to STD  The patient's pertinent negatives include no genital injury, genital itching, genital lesions, pelvic pain, penile discharge, penile pain, priapism, scrotal swelling or testicular pain. This is a new problem. The current episode started today. The problem occurs intermittently. The problem has been gradually worsening. The patient is experiencing no pain. Pertinent negatives include no abdominal pain, anorexia, chest pain, chills, constipation, coughing, diarrhea, discolored urine, dysuria, fever, flank pain, frequency, headaches, hematuria, hesitancy, joint pain, joint swelling, nausea, painful intercourse, rash, shortness of breath, sore throat, urgency, urinary retention or vomiting. Nothing aggravates the symptoms. He has tried nothing for the symptoms. The treatment provided no relief. He is sexually active. Yes, his partner has an STD. His past medical history is significant for chlamydia and gonorrhea. There is no history of BPH, cryptorchidism, erectile aid use, erectile dysfunction, a femoral hernia, herpes simplex, HIV, an inguinal hernia, kidney stones, prostatitis, sickle cell disease, syphilis or varicocele.     Constitution: Negative for chills and fever.   HENT:  Negative for sore throat.    Cardiovascular:  Negative for chest pain.   Respiratory:  Negative for cough and shortness of breath.    Gastrointestinal:  Negative for abdominal pain, nausea, vomiting, " "constipation, diarrhea, rectal bleeding and rectal pain.   Genitourinary:  Negative for dysuria, frequency, urgency, urine decreased, flank pain, bladder incontinence, bed wetting, hematuria, history of kidney stones, genital trauma, painful ejaculation, genital sore, penile discharge, painful ejaculation, penile pain, penile swelling, scrotal swelling, testicular pain and pelvic pain.   Skin:  Negative for rash.   Neurological:  Negative for headaches.    Objective:     Vitals:    04/20/23 1814   BP: 129/80   Pulse: 80   Resp: 20   Temp: 98.1 °F (36.7 °C)   TempSrc: Tympanic   SpO2: 98%   Weight: 84.8 kg (187 lb)   Height: 5' 8" (1.727 m)       Physical Exam   Constitutional: He does not appear ill.   HENT:   Ears:   Right Ear: External ear normal.   Left Ear: External ear normal.   Nose: Nose normal.   Eyes: Conjunctivae are normal.   Cardiovascular: Normal pulses.   Pulmonary/Chest: Effort normal.   Abdominal: He exhibits no distension. There is no guarding.   Genitourinary:         Comments: Deferred / declined     Neurological: He is alert.   Nursing note and vitals reviewed.    Assessment:     1. Screen for STD (sexually transmitted disease)        Plan:       Screen for STD (sexually transmitted disease)  -     C. trachomatis/N. gonorrhoeae by AMP DNA [facility]; Urine  -     Herpes simplex Virus (HSV) Type 1 & 2 DNA by PCR  -     HIV 1/2 Ag/Ab (4th Gen)  -     Trichomonas vaginalis, RNA, [facility], Urine  -     RPR          Medical Decision Making:   Clinical Tests:   Lab Tests: Ordered and Reviewed     Patient Instructions   YOU HAD SEXUALLY TRANSMITTED DISEASE CONCERNS TODAY:    - We will call you with test results within the next week  - Please use condoms for future sexual intercourse  - Please seek medical attention if you begin to experience any new symptoms.     Increase condom use to prevent further occurance.  Notify sexual partners of the need for testing.  Complete ALL medication prescribed!    NO sexual " intercourse now and UNTIL 7 days after treatment.     Retest to ensure infection has cleared-there are infections that require more agressive treatment.  Retest for all STDS in 6 weeks and again in 6 months to ensure true negative results.      Please note: Today's testing will give no crediable information if you have unprotected sexual activities going forward. Syphillis cases are rising!  Gonorrhea has RESISTANT strains which is why repeat testing after treatment is important.  Gonorrhea may be present in multiple sites from just ONE area of exposure.  For those who have high risk sexual behaviors and are on Truvada for PrEP- you have additional protection against HIV ONLY.      REMEMBER WEAR CONDOMS AND GET TESTED OFTEN!     If you have been discharged from the clinic prior to your point of care test results being completed, please make sure to check your [employer] account.  If there is a change in treatment, we will communicate with you through here.  If your test is positive, and medications are ordered, these will be sent to your preferred pharmacy.   If your test is negative, no further steps needed. If you do not hear from us or have questions, please call the clinic.      - You must understand that you have received an Urgent Care treatment only and that you may be released before all of your medical problems are known or treated.   - You, the patient, will arrange for follow up care as instructed with your primary care provider or recommended specialist.   - If your condition worsens or fails to improve we recommend that you receive another evaluation at the ER immediately or contact your PCP to discuss your concerns, or return here.   - Please do not drive or make any important decisions for 24 hours if you have received any pain medications, sedatives or mood altering drugs during your visit.    Disclaimer: This document was drafted with the use of a voice recognition device and is likely to have sound  alike errors.

## 2023-04-20 NOTE — PATIENT INSTRUCTIONS
YOU HAD SEXUALLY TRANSMITTED DISEASE CONCERNS TODAY:    - We will call you with test results within the next week  - Please use condoms for future sexual intercourse  - Please seek medical attention if you begin to experience any new symptoms.     Increase condom use to prevent further occurance.  Notify sexual partners of the need for testing.  Complete ALL medication prescribed!    NO sexual intercourse now and UNTIL 7 days after treatment.     Retest to ensure infection has cleared-there are infections that require more agressive treatment.  Retest for all STDS in 6 weeks and again in 6 months to ensure true negative results.      Please note: Today's testing will give no crediable information if you have unprotected sexual activities going forward. Syphillis cases are rising!  Gonorrhea has RESISTANT strains which is why repeat testing after treatment is important.  Gonorrhea may be present in multiple sites from just ONE area of exposure.  For those who have high risk sexual behaviors and are on Truvada for PrEP- you have additional protection against HIV ONLY.      REMEMBER WEAR CONDOMS AND GET TESTED OFTEN!     If you have been discharged from the clinic prior to your point of care test results being completed, please make sure to check your [employer] account.  If there is a change in treatment, we will communicate with you through here.  If your test is positive, and medications are ordered, these will be sent to your preferred pharmacy.   If your test is negative, no further steps needed. If you do not hear from us or have questions, please call the clinic.      - You must understand that you have received an Urgent Care treatment only and that you may be released before all of your medical problems are known or treated.   - You, the patient, will arrange for follow up care as instructed with your primary care provider or recommended specialist.   - If your condition worsens or fails to improve we recommend  that you receive another evaluation at the ER immediately or contact your PCP to discuss your concerns, or return here.   - Please do not drive or make any important decisions for 24 hours if you have received any pain medications, sedatives or mood altering drugs during your visit.    Disclaimer: This document was drafted with the use of a voice recognition device and is likely to have sound alike errors.

## 2023-04-21 LAB — HIV 1+2 AB+HIV1 P24 AG SERPL QL IA: NORMAL

## 2023-04-22 LAB
RPR SER QL: REACTIVE
RPR SER-TITR: ABNORMAL {TITER}

## 2023-04-23 LAB
C TRACH DNA SPEC QL NAA+PROBE: NOT DETECTED
HSV-1 DNA BY PCR: NEGATIVE
HSV-2 DNA BY PCR: NEGATIVE
N GONORRHOEA DNA SPEC QL NAA+PROBE: NOT DETECTED

## 2023-04-24 LAB
T PALLIDUM AB SER QL IF: REACTIVE
T VAGINALIS RRNA SPEC QL NAA+PROBE: NOT DETECTED
TRICHOMONAS VAGINALIS RNA, QUAL, SOURCE: NORMAL

## 2023-10-23 ENCOUNTER — OFFICE VISIT (OUTPATIENT)
Dept: URGENT CARE | Facility: CLINIC | Age: 28
End: 2023-10-23
Payer: COMMERCIAL

## 2023-10-23 VITALS
HEART RATE: 67 BPM | WEIGHT: 178 LBS | OXYGEN SATURATION: 97 % | TEMPERATURE: 98 F | SYSTOLIC BLOOD PRESSURE: 139 MMHG | RESPIRATION RATE: 16 BRPM | HEIGHT: 68 IN | BODY MASS INDEX: 26.98 KG/M2 | DIASTOLIC BLOOD PRESSURE: 86 MMHG

## 2023-10-23 DIAGNOSIS — Z20.2 EXPOSURE TO STD: Primary | ICD-10-CM

## 2023-10-23 DIAGNOSIS — Z76.0 MEDICATION REFILL: ICD-10-CM

## 2023-10-23 LAB
BILIRUB UR QL STRIP: NEGATIVE
COLOR UR: YELLOW
GLUCOSE UR QL STRIP: NEGATIVE
KETONES UR QL STRIP: NEGATIVE
LEUKOCYTE ESTERASE UR QL STRIP: NEGATIVE
PH, POC UA: 6
POC BLOOD, URINE: POSITIVE
POC NITRATES, URINE: NEGATIVE
PROT UR QL STRIP: NEGATIVE
SP GR UR STRIP: 1.02 (ref 1–1.03)
UROBILINOGEN UR STRIP-ACNC: NORMAL (ref 0.3–2.2)

## 2023-10-23 PROCEDURE — 81003 URINALYSIS AUTO W/O SCOPE: CPT | Mod: QW,S$GLB,, | Performed by: PHYSICIAN ASSISTANT

## 2023-10-23 PROCEDURE — 87491 CHLMYD TRACH DNA AMP PROBE: CPT | Performed by: PHYSICIAN ASSISTANT

## 2023-10-23 PROCEDURE — 99214 PR OFFICE/OUTPT VISIT, EST, LEVL IV, 30-39 MIN: ICD-10-PCS | Mod: S$GLB,,, | Performed by: PHYSICIAN ASSISTANT

## 2023-10-23 PROCEDURE — 99214 OFFICE O/P EST MOD 30 MIN: CPT | Mod: S$GLB,,, | Performed by: PHYSICIAN ASSISTANT

## 2023-10-23 PROCEDURE — 81003 POCT URINALYSIS, DIPSTICK, AUTOMATED, W/O SCOPE: ICD-10-PCS | Mod: QW,S$GLB,, | Performed by: PHYSICIAN ASSISTANT

## 2023-10-23 RX ORDER — ALBUTEROL SULFATE 90 UG/1
2 AEROSOL, METERED RESPIRATORY (INHALATION) EVERY 6 HOURS PRN
Qty: 18 G | Refills: 0 | Status: SHIPPED | OUTPATIENT
Start: 2023-10-23 | End: 2024-10-22

## 2023-10-23 RX ORDER — DOXYCYCLINE HYCLATE 100 MG
100 TABLET ORAL 2 TIMES DAILY
Qty: 14 TABLET | Refills: 0 | Status: SHIPPED | OUTPATIENT
Start: 2023-10-23 | End: 2023-10-30

## 2023-10-23 NOTE — PROGRESS NOTES
"Subjective:      Patient ID: Brandon Pal is a 28 y.o. male.    Vitals:  height is 5' 8" (1.727 m) and weight is 80.7 kg (178 lb). His oral temperature is 98.2 °F (36.8 °C). His blood pressure is 139/86 and his pulse is 67. His respiration is 16 and oxygen saturation is 97%.     Chief Complaint: Exposure to STD (Gonorrhea exposure)    28 year old male patient presented here today with std exposure. Pt states he was informed that his partner has gonorrhea. Pt states he has no sx at this time. Pt denies dysuria, penile discharge, hematuria, or any other symptoms at this time.      Exposure to STD  The patient's pertinent negatives include no genital injury, genital itching, genital lesions, pelvic pain, penile discharge, penile pain, priapism, scrotal swelling or testicular pain. This is a new problem. The problem has been unchanged. The patient is experiencing no pain. Pertinent negatives include no abdominal pain, anorexia, chest pain, chills, constipation, coughing, diarrhea, discolored urine, dysuria, fever, flank pain, frequency, headaches, hematuria, hesitancy, joint pain, joint swelling, nausea, painful intercourse, rash, shortness of breath, sore throat, urgency, urinary retention or vomiting. Nothing aggravates the symptoms. He has tried nothing for the symptoms. He is sexually active. He never uses condoms. Yes, his partner has an STD. His past medical history is significant for syphilis. There is no history of chlamydia.       Constitution: Negative for chills and fever.   HENT:  Negative for sore throat.    Cardiovascular:  Negative for chest pain.   Respiratory:  Negative for cough and shortness of breath.    Gastrointestinal:  Negative for abdominal pain, nausea, vomiting, constipation and diarrhea.   Genitourinary:  Negative for dysuria, frequency, urgency, flank pain, penile discharge, penile pain, scrotal swelling, testicular pain and pelvic pain.   Skin:  Negative for rash.   Neurological:  " "Negative for headaches.      Objective:     Vitals:    10/23/23 1758   BP: 139/86   Pulse: 67   Resp: 16   Temp: 98.2 °F (36.8 °C)   TempSrc: Oral   SpO2: 97%   Weight: 80.7 kg (178 lb)   Height: 5' 8" (1.727 m)       Physical Exam   HENT:   Head: Normocephalic and atraumatic.   Ears:   Right Ear: External ear normal.   Left Ear: External ear normal.   Eyes: Conjunctivae are normal.   Cardiovascular: Normal rate and normal pulses.   Pulmonary/Chest: Effort normal.   Genitourinary:         Comments: Declined      Neurological: He is alert.   Nursing note and vitals reviewed.      Assessment:     1. Exposure to STD    2. Medication refill      Results for orders placed or performed in visit on 10/23/23   POCT Urinalysis, Dipstick, Automated, W/O Scope   Result Value Ref Range    POC Blood, Urine Positive (A) Negative    POC Bilirubin, Urine Negative Negative    POC Urobilinogen, Urine Normal 0.3 - 2.2    POC Ketones, Urine Negative Negative    POC Protein, Urine Negative Negative    POC Nitrates, Urine Negative Negative    POC Glucose, Urine Negative Negative    pH, UA 6.0     POC Specific Gravity, Urine 1.020 1.003 - 1.029    POC Leukocytes, Urine Negative Negative    Color, UA Yellow Light Yellow, Yellow       Plan:       Exposure to STD  -     POCT Urinalysis, Dipstick, Automated, W/O Scope  -     C. trachomatis/N. gonorrhoeae by AMP DNA [facility]; Urine  -     doxycycline (VIBRA-TABS) 100 MG tablet; Take 1 tablet (100 mg total) by mouth 2 (two) times daily. for 7 days  Dispense: 14 tablet; Refill: 0  -     HIV 1/2 Ag/Ab (4th Gen); Future; Expected date: 10/30/2023  -     RPR; Future; Expected date: 10/30/2023  -     Herpes simplex Virus (HSV) Type 1 & 2 DNA by PCR; Future; Expected date: 10/30/2023    Medication refill  -     albuterol (PROVENTIL HFA) 90 mcg/actuation inhaler; Inhale 2 puffs into the lungs every 6 (six) hours as needed for Wheezing. Rescue  Dispense: 18 g; Refill: 0          Medical Decision Making: "   Initial Assessment:   VSS  Pt is homosexual   Requesting std panel   Clinical Tests:   Lab Tests: Ordered and Reviewed  The following lab test(s) were unremarkable: Urinalysis         Patient Instructions   YOU HAD SEXUALLY TRANSMITTED DISEASE CONCERNS TODAY:    You were prophylactically treated for gonorrhea/chlamydia today.  This means that you desired to get treated prior to knowing your results.    - We will call you with test results within the next week  - Please use condoms for future sexual intercourse  - Please seek medical attention if you begin to experience any new symptoms.     Increase condom use to prevent further occurance.  Notify sexual partners of the need for testing.  Complete ALL medication prescribed!    NO sexual intercourse now and UNTIL 7 days after treatment.     Retest to ensure infection has cleared-there are infections that require more agressive treatment.  Retest for all STDS in 6 weeks and again in 6 months to ensure true negative results.      Please note: Today's testing will give no crediable information if you have unprotected sexual activities going forward. Syphillis cases are rising!  Gonorrhea has RESISTANT strains which is why repeat testing after treatment is important.  Gonorrhea may be present in multiple sites from just ONE area of exposure.  For those who have high risk sexual behaviors and are on Truvada for PrEP- you have additional protection against HIV ONLY.      REMEMBER WEAR CONDOMS AND GET TESTED OFTEN!     If you have been discharged from the clinic prior to your point of care test results being completed, please make sure to check your [employer] account.  If there is a change in treatment, we will communicate with you through here.  If your test is positive, and medications are ordered, these will be sent to your preferred pharmacy.   If your test is negative, no further steps needed. If you do not hear from us or have questions, please call the clinic.      -  You must understand that you have received an Urgent Care treatment only and that you may be released before all of your medical problems are known or treated.   - You, the patient, will arrange for follow up care as instructed with your primary care provider or recommended specialist.   - If your condition worsens or fails to improve we recommend that you receive another evaluation at the ER immediately or contact your PCP to discuss your concerns, or return here.   - Please do not drive or make any important decisions for 24 hours if you have received any pain medications, sedatives or mood altering drugs during your visit.    Disclaimer: This document was drafted with the use of a voice recognition device and is likely to have sound alike errors.

## 2023-10-23 NOTE — PATIENT INSTRUCTIONS
YOU HAD SEXUALLY TRANSMITTED DISEASE CONCERNS TODAY:    You were prophylactically treated for gonorrhea/chlamydia today.  This means that you desired to get treated prior to knowing your results.    - We will call you with test results within the next week  - Please use condoms for future sexual intercourse  - Please seek medical attention if you begin to experience any new symptoms.     Increase condom use to prevent further occurance.  Notify sexual partners of the need for testing.  Complete ALL medication prescribed!    NO sexual intercourse now and UNTIL 7 days after treatment.     Retest to ensure infection has cleared-there are infections that require more agressive treatment.  Retest for all STDS in 6 weeks and again in 6 months to ensure true negative results.      Please note: Today's testing will give no crediable information if you have unprotected sexual activities going forward. Syphillis cases are rising!  Gonorrhea has RESISTANT strains which is why repeat testing after treatment is important.  Gonorrhea may be present in multiple sites from just ONE area of exposure.  For those who have high risk sexual behaviors and are on Truvada for PrEP- you have additional protection against HIV ONLY.      REMEMBER WEAR CONDOMS AND GET TESTED OFTEN!     If you have been discharged from the clinic prior to your point of care test results being completed, please make sure to check your [employer] account.  If there is a change in treatment, we will communicate with you through here.  If your test is positive, and medications are ordered, these will be sent to your preferred pharmacy.   If your test is negative, no further steps needed. If you do not hear from us or have questions, please call the clinic.      - You must understand that you have received an Urgent Care treatment only and that you may be released before all of your medical problems are known or treated.   - You, the patient, will arrange for follow up  care as instructed with your primary care provider or recommended specialist.   - If your condition worsens or fails to improve we recommend that you receive another evaluation at the ER immediately or contact your PCP to discuss your concerns, or return here.   - Please do not drive or make any important decisions for 24 hours if you have received any pain medications, sedatives or mood altering drugs during your visit.    Disclaimer: This document was drafted with the use of a voice recognition device and is likely to have sound alike errors.

## 2023-10-25 LAB
C TRACH DNA SPEC QL NAA+PROBE: NOT DETECTED
N GONORRHOEA DNA SPEC QL NAA+PROBE: NOT DETECTED

## 2023-10-26 ENCOUNTER — TELEPHONE (OUTPATIENT)
Dept: URGENT CARE | Facility: CLINIC | Age: 28
End: 2023-10-26
Payer: COMMERCIAL

## 2023-10-27 NOTE — TELEPHONE ENCOUNTER
RE: results    Spoke with pt & advise of neg result CT/NG. Pt states he will go & have his blood work that was ordered on next week. Pt v/u

## 2024-06-26 ENCOUNTER — OFFICE VISIT (OUTPATIENT)
Dept: URGENT CARE | Facility: CLINIC | Age: 29
End: 2024-06-26
Payer: COMMERCIAL

## 2024-06-26 ENCOUNTER — HOSPITAL ENCOUNTER (OUTPATIENT)
Dept: RADIOLOGY | Facility: CLINIC | Age: 29
Discharge: HOME | End: 2024-06-26
Attending: PHYSICIAN ASSISTANT
Payer: COMMERCIAL

## 2024-06-26 VITALS
HEIGHT: 68 IN | OXYGEN SATURATION: 99 % | TEMPERATURE: 99 F | BODY MASS INDEX: 26.07 KG/M2 | HEART RATE: 74 BPM | DIASTOLIC BLOOD PRESSURE: 88 MMHG | RESPIRATION RATE: 18 BRPM | SYSTOLIC BLOOD PRESSURE: 134 MMHG | WEIGHT: 172 LBS

## 2024-06-26 DIAGNOSIS — S60.112A CONTUSION OF LEFT THUMB NAIL, INITIAL ENCOUNTER: Primary | ICD-10-CM

## 2024-06-26 DIAGNOSIS — S69.90XA THUMB INJURY, INITIAL ENCOUNTER: ICD-10-CM

## 2024-06-26 PROCEDURE — 99214 OFFICE O/P EST MOD 30 MIN: CPT | Mod: S$GLB,,, | Performed by: PHYSICIAN ASSISTANT
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
## 2024-06-26 NOTE — PATIENT INSTRUCTIONS
R.I.C.E:    Rest, apply ice intermittently, Do not apply ice directly to skin. Wrap ice in cloth before applying.      OTC Tylenol (acetaminophen) up to 4,000 mg a day is safe for short periods and can be used for pain, and fever. However in high doses and prolonged use it can cause liver irritation.    OTC Ibuprofen (NSAID) is a non-steroidal anti-inflammatory that can be used for pain. However it can also cause stomach irritation if over used.    Of note: Aleve, Motrin, Advil, Mobic, meloxicam, and indomethacin are also other names of NSAIDs.      You will need to follow-up with orthopedics if your symptoms persist, as we discussed.        If you have been discharged from the clinic prior to your point of care test results being completed, please make sure to check your MyChart account.  If there is a change in treatment, we will communicate with you through here.  If your test is positive, and medications are ordered, these will be sent to your preferred pharmacy.   If your test is negative, no further steps needed. If you do not hear from us or have questions, please call the clinic.      - You must understand that you have received an Urgent Care treatment only and that you may be released before all of your medical problems are known or treated.   - You, the patient, will arrange for follow up care as instructed with your primary care provider or recommended specialist.   - If your condition worsens or fails to improve we recommend that you receive another evaluation at the ER immediately or contact your PCP to discuss your concerns, or return here.   - Please do not drive or make any important decisions for 24 hours if you have received any pain medications, sedatives or mood altering drugs during your visit.    Disclaimer: This document was drafted with the use of a voice recognition device and is likely to have sound alike errors.

## 2024-06-27 ENCOUNTER — TELEPHONE (OUTPATIENT)
Dept: URGENT CARE | Facility: CLINIC | Age: 29
End: 2024-06-27
Payer: COMMERCIAL

## 2024-06-27 NOTE — TELEPHONE ENCOUNTER
Re: Courtesy Call    Spoke with the patient. He stated his visit went great yesterday. Informed him of his normal x-ray results.

## 2024-07-05 ENCOUNTER — HOSPITAL ENCOUNTER (EMERGENCY)
Facility: HOSPITAL | Age: 29
Discharge: HOME | End: 2024-07-05
Attending: EMERGENCY MEDICINE
Payer: COMMERCIAL

## 2024-07-05 VITALS
SYSTOLIC BLOOD PRESSURE: 139 MMHG | RESPIRATION RATE: 18 BRPM | DIASTOLIC BLOOD PRESSURE: 72 MMHG | OXYGEN SATURATION: 98 % | BODY MASS INDEX: 26.07 KG/M2 | HEART RATE: 82 BPM | WEIGHT: 172 LBS | TEMPERATURE: 98 F | HEIGHT: 68 IN

## 2024-07-05 DIAGNOSIS — S60.012D CONTUSION OF LEFT THUMB WITHOUT DAMAGE TO NAIL, SUBSEQUENT ENCOUNTER: Primary | ICD-10-CM

## 2024-07-05 PROCEDURE — 99281 EMR DPT VST MAYX REQ PHY/QHP: CPT | Mod: ER

## 2024-07-05 NOTE — ED PROVIDER NOTES
Encounter Date: 7/5/2024       History     Chief Complaint   Patient presents with    Hand Pain     Pt arrives to the ED c/o left thumb pain and swelling - hit the thumb on the door 1 week ago.      29-year-old male presents with swelling to the left thumb.  Patient injured thumb on the June 22nd.  He was seen at urgent care and had x-ray done which was unremarkable.  Patient said he expected swelling to be resolved by now.  He just wanted to get checked out.  He says he is unsure if he hit it again.  No pain.      Review of patient's allergies indicates:  No Known Allergies  History reviewed. No pertinent past medical history.  History reviewed. No pertinent surgical history.  No family history on file.  Social History     Tobacco Use    Smoking status: Every Day     Types: Vaping with nicotine     Passive exposure: Current    Smokeless tobacco: Never   Substance Use Topics    Alcohol use: Yes    Drug use: Yes     Types: Marijuana     Review of Systems   Constitutional:  Negative for fever.   Musculoskeletal:  Positive for joint swelling. Negative for arthralgias.       Physical Exam     Initial Vitals [07/05/24 0533]   BP Pulse Resp Temp SpO2   139/72 82 18 98.1 °F (36.7 °C) 98 %      MAP       --         Physical Exam    Nursing note and vitals reviewed.  HENT:   Head: Atraumatic.   Musculoskeletal:         General: Normal range of motion.      Comments: Mild ecchymosis noted to the distal aspect of the left thumb.  No tenderness.  No erythema or purulent drainage.  Nail fold is normal.  Fat pad fingers normal.     Neurological: He is alert and oriented to person, place, and time.         ED Course   Procedures  Labs Reviewed - No data to display       Imaging Results    None          Medications - No data to display  Medical Decision Making  29-year-old male presenting with swelling and bruising to the left thumb after injury on June 22nd.  Vital signs unremarkable.  Patient has some ecchymosis still to the left  thumb.  There is no tenderness.  There is no inflammation along the nail fold.  Low suspicion for paronychia.  No evidence of felon.  No new trauma that the patient is aware of.  No indication for reimaging.  I do not think patient needs antibiotics as there is no evidence of infection.  Plan for continued cold compresses and observation.  Return instructions given.                                      Clinical Impression:  Final diagnoses:  [S60.329D] Contusion of left thumb without damage to nail, subsequent encounter (Primary)          ED Disposition Condition    Discharge Stable          ED Prescriptions    None       Follow-up Information       Follow up With Specialties Details Why Contact Info    Osvaldo Vale MD Internal Medicine Schedule an appointment as soon as possible for a visit in 1 week For wound re-check [id number]  [place] [address]  [phone]               Paul Khan MD  07/05/24 0604